# Patient Record
Sex: MALE | Race: WHITE | URBAN - METROPOLITAN AREA
[De-identification: names, ages, dates, MRNs, and addresses within clinical notes are randomized per-mention and may not be internally consistent; named-entity substitution may affect disease eponyms.]

---

## 2017-03-03 ENCOUNTER — FOLLOW UP ESTABLISHED (OUTPATIENT)
Dept: URBAN - METROPOLITAN AREA CLINIC 30 | Facility: CLINIC | Age: 72
End: 2017-03-03
Payer: COMMERCIAL

## 2017-03-03 DIAGNOSIS — H43.393 OTHER VITREOUS OPACITIES, BILATERAL: ICD-10-CM

## 2017-03-03 PROCEDURE — 92134 CPTRZ OPH DX IMG PST SGM RTA: CPT | Performed by: OPTOMETRIST

## 2017-03-03 PROCEDURE — 92014 COMPRE OPH EXAM EST PT 1/>: CPT | Performed by: OPTOMETRIST

## 2017-03-03 ASSESSMENT — KERATOMETRY
OD: 43.98
OS: 43.95

## 2017-03-03 ASSESSMENT — INTRAOCULAR PRESSURE
OS: 14
OD: 14

## 2017-03-03 ASSESSMENT — VISUAL ACUITY
OD: 20/25
OS: 20/25

## 2018-04-12 ENCOUNTER — FOLLOW UP ESTABLISHED (OUTPATIENT)
Dept: URBAN - METROPOLITAN AREA CLINIC 30 | Facility: CLINIC | Age: 73
End: 2018-04-12
Payer: COMMERCIAL

## 2018-04-12 DIAGNOSIS — H25.13 AGE-RELATED NUCLEAR CATARACT, BILATERAL: ICD-10-CM

## 2018-04-12 PROCEDURE — 92014 COMPRE OPH EXAM EST PT 1/>: CPT | Performed by: OPTOMETRIST

## 2018-04-12 ASSESSMENT — INTRAOCULAR PRESSURE
OD: 16
OS: 14

## 2018-04-12 ASSESSMENT — KERATOMETRY
OD: 43.87
OS: 43.81

## 2018-04-12 ASSESSMENT — VISUAL ACUITY
OS: 20/25
OD: 20/25

## 2019-02-19 ENCOUNTER — FOLLOW UP ESTABLISHED (OUTPATIENT)
Dept: URBAN - METROPOLITAN AREA CLINIC 30 | Facility: CLINIC | Age: 74
End: 2019-02-19
Payer: COMMERCIAL

## 2019-02-19 PROCEDURE — 92014 COMPRE OPH EXAM EST PT 1/>: CPT | Performed by: OPTOMETRIST

## 2019-02-19 PROCEDURE — 92134 CPTRZ OPH DX IMG PST SGM RTA: CPT | Performed by: OPTOMETRIST

## 2019-02-19 ASSESSMENT — KERATOMETRY
OD: 44.09
OS: 43.91

## 2019-02-19 ASSESSMENT — VISUAL ACUITY
OS: 20/40
OD: 20/40

## 2019-02-19 ASSESSMENT — INTRAOCULAR PRESSURE
OD: 15
OS: 16

## 2019-05-21 ENCOUNTER — Encounter (OUTPATIENT)
Dept: URBAN - METROPOLITAN AREA CLINIC 30 | Facility: CLINIC | Age: 74
End: 2019-05-21
Payer: COMMERCIAL

## 2019-05-21 DIAGNOSIS — H25.11 AGE-RELATED NUCLEAR CATARACT, RIGHT EYE: Primary | ICD-10-CM

## 2019-05-21 DIAGNOSIS — Z01.818 ENCOUNTER FOR OTHER PREPROCEDURAL EXAMINATION: Primary | ICD-10-CM

## 2019-05-21 PROCEDURE — 99213 OFFICE O/P EST LOW 20 MIN: CPT | Performed by: PHYSICIAN ASSISTANT

## 2019-05-21 PROCEDURE — 92025 CPTRIZED CORNEAL TOPOGRAPHY: CPT | Performed by: OPHTHALMOLOGY

## 2019-05-21 RX ORDER — HYDROCHLOROTHIAZIDE 25 MG/1
25 MG TABLET ORAL
Qty: 0 | Refills: 0 | Status: ACTIVE
Start: 2019-05-21

## 2019-05-23 ENCOUNTER — FOLLOW UP ESTABLISHED (OUTPATIENT)
Dept: URBAN - METROPOLITAN AREA CLINIC 10 | Facility: CLINIC | Age: 74
End: 2019-05-23
Payer: COMMERCIAL

## 2019-05-23 DIAGNOSIS — H52.223 REGULAR ASTIGMATISM, BILATERAL: ICD-10-CM

## 2019-05-23 PROCEDURE — 99213 OFFICE O/P EST LOW 20 MIN: CPT | Performed by: OPHTHALMOLOGY

## 2019-05-23 PROCEDURE — 92136 OPHTHALMIC BIOMETRY: CPT | Performed by: OPHTHALMOLOGY

## 2019-05-23 ASSESSMENT — INTRAOCULAR PRESSURE
OS: 15
OD: 15

## 2019-05-30 ENCOUNTER — SURGERY (OUTPATIENT)
Dept: URBAN - METROPOLITAN AREA SURGERY 5 | Facility: SURGERY | Age: 74
End: 2019-05-30
Payer: COMMERCIAL

## 2019-05-30 PROCEDURE — 66984 XCAPSL CTRC RMVL W/O ECP: CPT | Performed by: OPHTHALMOLOGY

## 2019-05-31 ENCOUNTER — POST OP (OUTPATIENT)
Dept: URBAN - METROPOLITAN AREA CLINIC 30 | Facility: CLINIC | Age: 74
End: 2019-05-31

## 2019-05-31 DIAGNOSIS — Z96.1 PRESENCE OF INTRAOCULAR LENS: Primary | ICD-10-CM

## 2019-05-31 PROCEDURE — 99024 POSTOP FOLLOW-UP VISIT: CPT | Performed by: OPTOMETRIST

## 2019-05-31 ASSESSMENT — INTRAOCULAR PRESSURE: OD: 16

## 2019-06-07 ENCOUNTER — POST OP (OUTPATIENT)
Dept: URBAN - METROPOLITAN AREA CLINIC 30 | Facility: CLINIC | Age: 74
End: 2019-06-07

## 2019-06-07 PROCEDURE — 99024 POSTOP FOLLOW-UP VISIT: CPT | Performed by: OPTOMETRIST

## 2019-06-07 ASSESSMENT — INTRAOCULAR PRESSURE
OD: 14
OS: 15

## 2019-06-07 ASSESSMENT — VISUAL ACUITY
OS: 20/40
OD: 20/20

## 2019-06-13 ENCOUNTER — SURGERY (OUTPATIENT)
Dept: URBAN - METROPOLITAN AREA SURGERY 5 | Facility: SURGERY | Age: 74
End: 2019-06-13
Payer: COMMERCIAL

## 2019-06-13 PROCEDURE — 66984 XCAPSL CTRC RMVL W/O ECP: CPT | Performed by: OPHTHALMOLOGY

## 2019-06-14 ENCOUNTER — POST OP (OUTPATIENT)
Dept: URBAN - METROPOLITAN AREA CLINIC 30 | Facility: CLINIC | Age: 74
End: 2019-06-14

## 2019-06-14 PROCEDURE — 99024 POSTOP FOLLOW-UP VISIT: CPT | Performed by: OPTOMETRIST

## 2019-06-14 ASSESSMENT — INTRAOCULAR PRESSURE: OS: 19

## 2019-07-01 ENCOUNTER — POST OP (OUTPATIENT)
Dept: URBAN - METROPOLITAN AREA CLINIC 30 | Facility: CLINIC | Age: 74
End: 2019-07-01
Payer: COMMERCIAL

## 2019-07-01 PROCEDURE — 99024 POSTOP FOLLOW-UP VISIT: CPT | Performed by: OPTOMETRIST

## 2019-07-01 PROCEDURE — 92015 DETERMINE REFRACTIVE STATE: CPT | Performed by: OPTOMETRIST

## 2019-07-01 ASSESSMENT — INTRAOCULAR PRESSURE
OS: 17
OD: 18

## 2019-07-01 ASSESSMENT — VISUAL ACUITY
OD: 20/20
OS: 20/25

## 2021-04-23 ENCOUNTER — OFFICE VISIT (OUTPATIENT)
Dept: URBAN - METROPOLITAN AREA CLINIC 30 | Facility: CLINIC | Age: 76
End: 2021-04-23
Payer: COMMERCIAL

## 2021-04-23 DIAGNOSIS — H35.373 PUCKERING OF MACULA, BILATERAL: ICD-10-CM

## 2021-04-23 DIAGNOSIS — H16.223 KERATOCONJUNCTIVITIS SICCA, NOT SPECIFIED AS SJOGREN'S, BILATERAL: Primary | ICD-10-CM

## 2021-04-23 PROCEDURE — 92014 COMPRE OPH EXAM EST PT 1/>: CPT | Performed by: OPTOMETRIST

## 2021-04-23 PROCEDURE — 92134 CPTRZ OPH DX IMG PST SGM RTA: CPT | Performed by: OPTOMETRIST

## 2021-04-23 ASSESSMENT — INTRAOCULAR PRESSURE
OS: 13
OD: 16

## 2021-04-23 ASSESSMENT — VISUAL ACUITY
OD: 20/20
OS: 20/20

## 2021-04-23 ASSESSMENT — KERATOMETRY
OS: 44.03
OD: 43.41

## 2021-04-23 NOTE — IMPRESSION/PLAN
Impression: Other vitreous opacities, bilateral Plan: Retina exam appears stable. Signs and symptoms of RD reviewed. RTC STAT if any increased in floaters or loss of VA. See mac oct for findings.

## 2021-04-23 NOTE — IMPRESSION/PLAN
Impression: Puckering of macula, bilateral: H35.373. Plan: Mild. Continue to monitor. No symptoms of metamorphopsia. RTC if symptoms change. See mac oct for findings.

## 2022-10-25 ENCOUNTER — OFFICE VISIT (OUTPATIENT)
Dept: URBAN - METROPOLITAN AREA CLINIC 30 | Facility: CLINIC | Age: 77
End: 2022-10-25
Payer: COMMERCIAL

## 2022-10-25 DIAGNOSIS — H35.373 PUCKERING OF MACULA, BILATERAL: Primary | ICD-10-CM

## 2022-10-25 DIAGNOSIS — H16.223 KERATOCONJUNCTIVITIS SICCA, NOT SPECIFIED AS SJOGREN'S, BILATERAL: ICD-10-CM

## 2022-10-25 DIAGNOSIS — H26.493 OTHER SECONDARY CATARACT, BILATERAL: ICD-10-CM

## 2022-10-25 DIAGNOSIS — H43.393 OTHER VITREOUS OPACITIES, BILATERAL: ICD-10-CM

## 2022-10-25 PROCEDURE — 92134 CPTRZ OPH DX IMG PST SGM RTA: CPT | Performed by: OPTOMETRIST

## 2022-10-25 PROCEDURE — 99213 OFFICE O/P EST LOW 20 MIN: CPT | Performed by: OPTOMETRIST

## 2022-10-25 ASSESSMENT — VISUAL ACUITY
OS: 20/20
OD: 20/20

## 2022-10-25 ASSESSMENT — INTRAOCULAR PRESSURE
OS: 16
OD: 16

## 2022-10-25 ASSESSMENT — KERATOMETRY
OD: 43.45
OS: 43.95

## 2022-10-25 NOTE — IMPRESSION/PLAN
Impression: Other secondary cataract, bilateral: H26.493. Plan: Opacified capsule with option for YAG laser capsulotomy. Discussed procedure, risks, benefits and side effects. Patient denies YAG laser capsulotomy. Monitor.

## 2022-10-25 NOTE — IMPRESSION/PLAN
Impression: Puckering of macula, bilateral: H35.373. Plan: Mild. Continue to monitor. No symptoms of metamorphopsia. RTC if symptoms change. See mac oct for today's findings.

## 2022-10-25 NOTE — IMPRESSION/PLAN
Impression: Other vitreous opacities, bilateral Plan: Retina exam appears stable. Signs and symptoms of RD reviewed. RTC STAT if any increased in floaters or loss of VA. See mac oct for today's findings.

## 2022-12-07 NOTE — IMPRESSION/PLAN
Impression: Keratoconjunctivitis sicca, bilateral Plan: Cont use of AT's qid OU. O3's. Avoid ceiling fans. Libtayo Counseling- I discussed with the patient the risks of Libtayo including but not limited to nausea, vomiting, diarrhea, and bone or muscle pain.  The patient verbalized understanding of the proper use and possible adverse effects of Libtayo.  All of the patient's questions and concerns were addressed.